# Patient Record
Sex: FEMALE | Race: WHITE
[De-identification: names, ages, dates, MRNs, and addresses within clinical notes are randomized per-mention and may not be internally consistent; named-entity substitution may affect disease eponyms.]

---

## 2017-10-15 ENCOUNTER — HEALTH MAINTENANCE LETTER (OUTPATIENT)
Age: 22
End: 2017-10-15

## 2018-08-13 ENCOUNTER — HEALTH MAINTENANCE LETTER (OUTPATIENT)
Age: 23
End: 2018-08-13

## 2019-12-23 ENCOUNTER — APPOINTMENT (OUTPATIENT)
Age: 24
Setting detail: DERMATOLOGY
End: 2019-12-23

## 2019-12-23 VITALS — RESPIRATION RATE: 15 BRPM | HEIGHT: 64 IN | WEIGHT: 135 LBS

## 2019-12-23 DIAGNOSIS — L70.0 ACNE VULGARIS: ICD-10-CM

## 2019-12-23 PROCEDURE — OTHER URINE PREGNANCY TEST: OTHER

## 2019-12-23 PROCEDURE — 99214 OFFICE O/P EST MOD 30 MIN: CPT

## 2019-12-23 PROCEDURE — OTHER ISOTRETINOIN INITIATION: OTHER

## 2019-12-23 PROCEDURE — OTHER ADDITIONAL NOTES: OTHER

## 2019-12-23 PROCEDURE — 81025 URINE PREGNANCY TEST: CPT

## 2019-12-23 ASSESSMENT — LOCATION ZONE DERM: LOCATION ZONE: FACE

## 2019-12-23 ASSESSMENT — LOCATION SIMPLE DESCRIPTION DERM: LOCATION SIMPLE: LEFT CHEEK

## 2019-12-23 ASSESSMENT — LOCATION DETAILED DESCRIPTION DERM: LOCATION DETAILED: LEFT CENTRAL MALAR CHEEK

## 2019-12-23 NOTE — PROCEDURE: ADDITIONAL NOTES
Detail Level: Simple
Additional Notes: - Will need 8-hour fasting baseline lab work at next office visit.

## 2019-12-23 NOTE — HPI: PIMPLES (ACNE)
How Severe Is Your Acne?: severe
Is This A New Presentation, Or A Follow-Up?: Acne
Additional Comments (Use Complete Sentences): She is interested in accutane. Minocycline helped but got minocycline staining.  Gets new pimples daily.   Uses oral contraceptive pill and condoms.

## 2020-01-24 ENCOUNTER — APPOINTMENT (OUTPATIENT)
Age: 25
Setting detail: DERMATOLOGY
End: 2020-01-24

## 2020-01-24 VITALS — RESPIRATION RATE: 15 BRPM | WEIGHT: 135 LBS | HEIGHT: 66 IN

## 2020-01-24 DIAGNOSIS — L70.0 ACNE VULGARIS: ICD-10-CM

## 2020-01-24 PROCEDURE — OTHER VENIPUNCTURE: OTHER

## 2020-01-24 PROCEDURE — OTHER ORDER TESTS: OTHER

## 2020-01-24 PROCEDURE — OTHER URINE PREGNANCY TEST: OTHER

## 2020-01-24 PROCEDURE — 99213 OFFICE O/P EST LOW 20 MIN: CPT | Mod: 25

## 2020-01-24 PROCEDURE — 81025 URINE PREGNANCY TEST: CPT

## 2020-01-24 PROCEDURE — 36415 COLL VENOUS BLD VENIPUNCTURE: CPT

## 2020-01-24 PROCEDURE — OTHER PRESCRIPTION: OTHER

## 2020-01-24 PROCEDURE — OTHER ISOTRETINOIN INITIATION: OTHER

## 2020-01-24 PROCEDURE — OTHER ADDITIONAL NOTES: OTHER

## 2020-01-24 RX ORDER — ISOTRETINOIN 20 MG/1
20MG CAPSULE, LIQUID FILLED ORAL
Qty: 30 | Refills: 0 | Status: ERX | COMMUNITY
Start: 2020-01-24

## 2020-01-24 ASSESSMENT — LOCATION ZONE DERM
LOCATION ZONE: FACE
LOCATION ZONE: ARM

## 2020-01-24 ASSESSMENT — LOCATION SIMPLE DESCRIPTION DERM
LOCATION SIMPLE: LEFT CHEEK
LOCATION SIMPLE: RIGHT UPPER ARM

## 2020-01-24 ASSESSMENT — LOCATION DETAILED DESCRIPTION DERM
LOCATION DETAILED: RIGHT ANTECUBITAL SKIN
LOCATION DETAILED: LEFT CENTRAL MALAR CHEEK

## 2020-01-24 NOTE — HPI: MEDICATION (ISOTRETINOIN)
Is This A New Presentation, Or A Follow-Up?: Follow Up Isotretinoin
Additional History: Patient here tostart isotretinoin today.

## 2020-01-24 NOTE — PROCEDURE: ADDITIONAL NOTES
Additional Notes: - Will need 8-hour fasting baseline lab work at next office visit.
Detail Level: Simple

## 2020-02-24 ENCOUNTER — HEALTH MAINTENANCE LETTER (OUTPATIENT)
Age: 25
End: 2020-02-24

## 2020-05-08 ENCOUNTER — APPOINTMENT (OUTPATIENT)
Age: 25
Setting detail: DERMATOLOGY
End: 2020-05-08

## 2020-05-08 VITALS — HEIGHT: 66 IN | WEIGHT: 130 LBS

## 2020-05-08 DIAGNOSIS — L70.0 ACNE VULGARIS: ICD-10-CM

## 2020-05-08 PROCEDURE — 99214 OFFICE O/P EST MOD 30 MIN: CPT

## 2020-05-08 PROCEDURE — OTHER URINE PREGNANCY TEST: OTHER

## 2020-05-08 PROCEDURE — OTHER ISOTRETINOIN MONITORING: OTHER

## 2020-05-08 PROCEDURE — 81025 URINE PREGNANCY TEST: CPT

## 2020-05-08 PROCEDURE — OTHER PRESCRIPTION: OTHER

## 2020-05-08 RX ORDER — ISOTRETINOIN 30 MG/1
30 CAPSULE, LIQUID FILLED ORAL TWICE PER DAY
Qty: 60 | Refills: 0 | Status: ERX | COMMUNITY
Start: 2020-05-08

## 2020-05-08 ASSESSMENT — LOCATION ZONE DERM: LOCATION ZONE: FACE

## 2020-05-08 ASSESSMENT — LOCATION SIMPLE DESCRIPTION DERM: LOCATION SIMPLE: LEFT CHEEK

## 2020-05-08 ASSESSMENT — LOCATION DETAILED DESCRIPTION DERM: LOCATION DETAILED: LEFT CENTRAL MALAR CHEEK

## 2020-05-08 NOTE — HPI: MEDICATION (ISOTRETINOIN)
How Severe Is It?: moderate
Is This A New Presentation, Or A Follow-Up?: Follow Up Isotretinoin
Additional History: Started isotretinoin at last office visit January and was following up in another derm clinic for past 2 months, has used 30mg bid these past 2 months. Dryness is tolerable. Patient reports 100% compliance with both forms of birth control. Had labs drawn april 6th and alt showed 18 and triglycerides were 103. March labs were also within normal limits. 5 or 6 new medium pimples this past month.

## 2020-06-15 ENCOUNTER — APPOINTMENT (OUTPATIENT)
Age: 25
Setting detail: DERMATOLOGY
End: 2020-06-15

## 2020-06-15 VITALS — HEIGHT: 66 IN | RESPIRATION RATE: 16 BRPM | WEIGHT: 130 LBS

## 2020-06-15 DIAGNOSIS — L70.0 ACNE VULGARIS: ICD-10-CM

## 2020-06-15 PROCEDURE — OTHER ISOTRETINOIN MONITORING: OTHER

## 2020-06-15 PROCEDURE — 81025 URINE PREGNANCY TEST: CPT

## 2020-06-15 PROCEDURE — OTHER PRESCRIPTION: OTHER

## 2020-06-15 PROCEDURE — 99214 OFFICE O/P EST MOD 30 MIN: CPT

## 2020-06-15 PROCEDURE — OTHER URINE PREGNANCY TEST: OTHER

## 2020-06-15 RX ORDER — ISOTRETINOIN 30 MG/1
30 CAPSULE, LIQUID FILLED ORAL TWICE PER DAY
Qty: 60 | Refills: 0 | Status: ERX

## 2020-06-15 ASSESSMENT — LOCATION ZONE DERM: LOCATION ZONE: FACE

## 2020-06-15 ASSESSMENT — LOCATION SIMPLE DESCRIPTION DERM: LOCATION SIMPLE: LEFT CHEEK

## 2020-06-15 ASSESSMENT — LOCATION DETAILED DESCRIPTION DERM: LOCATION DETAILED: LEFT CENTRAL MALAR CHEEK

## 2020-06-15 NOTE — HPI: MEDICATION (ISOTRETINOIN)
How Severe Is It?: moderate
Is This A New Presentation, Or A Follow-Up?: Follow Up Isotretinoin
Additional History: Patient reports 100% compliance with both forms of birth control. Dryness is tolerable. 5 small new pimples this past month.

## 2020-06-15 NOTE — PROCEDURE: ISOTRETINOIN MONITORING
Upper Range (In Mg/Kg): 150
Dosing Month 4 (Required For Cumulative Dosing): 30mg BID
Show Text Field For Brand Names Of Contraception?: Yes
Myalgia Monitoring: - At this point, patient reports that this is mild and tolerable. Explained this is common when taking isotretinoin. If this worsens, contact us.
Hypercholesterolemia Monitoring: I explained this is common when taking isotretinoin. We will monitor closely.
Retinoid Dermatitis Normal Treatment: - Recommended application of Cetaphil or CeraVe cream numerous times a day and before going to bed to all dry and rashy areas.
Xerosis Aggressive Treatment: - Recommended application of Cetaphil or CeraVe cream numerous times a day and before going to bed to all dry areas.\\n- Also, will prescribe a topical steroid for twice daily use as needed until pruritus is resolved for never longer than 14 days per month.
Cheilitis Normal Treatment: - Recommended application of Vaseline ointment or Aquaphor numerous times a day (as often as every hour) and before going to bed.
Is Cheilitis Present?: Yes - Normal Treatment
Female Pregnancy Counseling Text: Female patients should also be on two forms of birth control while taking this medication and for one month after their last dose.
Kilograms Preamble Statement (Weight Entered In Details Tab): Reported Weight in kilograms:
Xerosis Normal Treatment: - Recommended application of Cetaphil or CeraVe cream numerous times a day and before going to bed to all dry areas.
Use Therapeutic Ranged Or Therapeutic Target: please select Range or Target
Display Individual Monthly Dosage In The Note (If Yes Will Display All Dosages Which Are Not N/A): no
Cheilitis Aggressive Treatment: - Recommended application of Dr. Dan’s Cortibalm used several times per day (as often as every hour you are awake when very severe lip dryness is present).\\n- Once improved switch back to Vaseline or Aquaphor numerous times a day and before going to bed.
Nosebleeds Normal Treatment: - I explained nosebleeds are common when taking isotretinoin. \\n- Recommended saline mist in each nostril multiple times a day. \\n- May also consider applying Aquaphor intranasally once every night.\\n- If this worsens, call the clinic. Patient expressed understanding.
Headache Monitoring: - Recommended monitoring the headaches for now. There is no evidence of increased intracranial pressure. They were instructed to call if the headaches are worsening.
Nosebleeds Normal Treatment: I explained this is common when taking isotretinoin. I recommended saline mist in each nostril multiple times a day. If this worsens they will contact us.
Counseling Text: I reviewed the side effect in detail. Patient should get monthly blood tests, not donate blood, not drive at night if vision affected, and not share medication.
Retinoid Dermatitis Normal Treatment: I recommended more frequent application of Cetaphil or CeraVe to the areas of dermatitis.
Detail Level: Zone
Weight Units: pounds
Xerosis Aggressive Treatment: I recommended application of Cetaphil or CeraVe numerous times a day going to bed to all dry areas. I also prescribed a topical steroid for twice daily use.
Hypertriglyceridemia Monitoring: - I explained this is common when taking isotretinoin. \\n- Advised that taking over-the-counter fish oil can be helpful to reduce triglycerides.\\n- We will continue to monitor.
Headache Monitoring: I recommended monitoring the headaches for now. There is no evidence of increased intracranial pressure. They were instructed to call if the headaches are worsening.
Next Month's Dosage: Continue Current Dosage
Months Of Therapy Completed: 4
Retinoid Dermatitis Aggressive Treatment: - Recommended application of Cetaphil or CeraVe cream numerous times a day and before going to bed to all dry and rashy areas.\\n- Also, will prescribe a topical steroid for twice daily use as needed until pruritus is resolved for never longer than 14 days per month.
Pounds Preamble Statement (Weight Entered In Details Tab): Reported Weight in pounds:
Male Completion Statement: - After discussing his treatment course we decided to discontinue isotretinoin therapy at this time.\\n- He shouldn't donate blood for one month after the last dose. He should call with any new symptoms of depression.\\n- Discussed maintenance options.
Target Cumulative Dosage (In Mg/Kg): 135
Retinoid Dermatitis Aggressive Treatment: I recommended more frequent application of Cetaphil or CeraVe to the areas of dermatitis. I also prescribed a topical steroid for twice daily use until the dermatitis resolves.
Myalgia Monitoring: I explained this is common when taking isotretinoin. If this worsens they will contact us.
Cheilitis Normal Treatment: I recommended application of Vaseline or Aquaphor numerous times a day (as often as every hour) and before going to bed.
Dosing Month 1 (Required For Cumulative Dosing): 20mg Daily
Myalgia Treatment: I explained this is common when taking isotretinoin. If this worsens they will contact us. They may try OTC ibuprofen.
Lower Range (In Mg/Kg): 120
Cheilitis Aggressive Treatment: I recommended application of Vaseline or Aquaphor numerous times a day (as often as every hour) and before going to bed. I also prescribed a topical steroid for twice daily use.
Myalgia Treatment: - Explained this is common when taking isotretinoin. \\n- May try OTC ibuprofen no more frequently than every 6 hours.\\n- If this worsens, then contact us.
Female Completion Statement: FEMALE PATIENT - FINAL VISIT \\n- Discussed that we will be discontinuing isotretinoin today.\\n- Reminded that it is still imperative to maintain 100% compliance with the same methods of birth control she has been using throughout this course of isotretinoin for at least 30 days beyond her final pill. \\n- Advise that a final urine pregnancy test will be necessary to complete the retirements of the [employer] program in 30-37 days from now. \\n- Advised to schedule a nurse visit for this final UPT.   \\n- Reviewed the possible expectations for her acne from here on out.\\n- Discussed maintenance options.\\n- Urine pregnant test performed today.
What Is The Patient's Gender: Female
Xerosis Normal Treatment: I recommended application of Cetaphil or CeraVe numerous times a day going to bed to all dry areas.

## 2020-07-20 ENCOUNTER — APPOINTMENT (OUTPATIENT)
Age: 25
Setting detail: DERMATOLOGY
End: 2020-07-20

## 2020-07-20 VITALS — HEIGHT: 66 IN | WEIGHT: 133 LBS | RESPIRATION RATE: 16 BRPM

## 2020-07-20 DIAGNOSIS — L70.0 ACNE VULGARIS: ICD-10-CM

## 2020-07-20 PROCEDURE — OTHER PRESCRIPTION: OTHER

## 2020-07-20 PROCEDURE — OTHER ISOTRETINOIN MONITORING: OTHER

## 2020-07-20 PROCEDURE — OTHER URINE PREGNANCY TEST: OTHER

## 2020-07-20 PROCEDURE — 81025 URINE PREGNANCY TEST: CPT

## 2020-07-20 PROCEDURE — 99214 OFFICE O/P EST MOD 30 MIN: CPT

## 2020-07-20 RX ORDER — ISOTRETINOIN 30 MG/1
30 CAPSULE, LIQUID FILLED ORAL TWICE PER DAY
Qty: 60 | Refills: 0 | Status: ERX

## 2020-07-20 ASSESSMENT — LOCATION SIMPLE DESCRIPTION DERM: LOCATION SIMPLE: LEFT CHEEK

## 2020-07-20 ASSESSMENT — LOCATION ZONE DERM: LOCATION ZONE: FACE

## 2020-07-20 ASSESSMENT — LOCATION DETAILED DESCRIPTION DERM: LOCATION DETAILED: LEFT CENTRAL MALAR CHEEK

## 2020-07-20 NOTE — PROCEDURE: ISOTRETINOIN MONITORING
Cheilitis Aggressive Treatment: - Recommended application of Dr. Dan’s Cortibalm used several times per day (as often as every hour you are awake when very severe lip dryness is present).\\n- Once improved switch back to Vaseline or Aquaphor numerous times a day and before going to bed.
Upper Range (In Mg/Kg): 150
Detail Level: Zone
Headache Monitoring: - Recommended monitoring the headaches for now. There is no evidence of increased intracranial pressure. They were instructed to call if the headaches are worsening.
Nosebleeds Normal Treatment: - I explained nosebleeds are common when taking isotretinoin. \\n- Recommended saline mist in each nostril multiple times a day. \\n- May also consider applying Aquaphor intranasally once every night.\\n- If this worsens, call the clinic. Patient expressed understanding.
Retinoid Dermatitis Aggressive Treatment: - Recommended application of Cetaphil or CeraVe cream numerous times a day and before going to bed to all dry and rashy areas.\\n- Also, will prescribe a topical steroid for twice daily use as needed until pruritus is resolved for never longer than 14 days per month.
Female Completion Statement: FEMALE PATIENT - FINAL VISIT \\n- Discussed that we will be discontinuing isotretinoin today.\\n- Reminded that it is still imperative to maintain 100% compliance with the same methods of birth control she has been using throughout this course of isotretinoin for at least 30 days beyond her final pill. \\n- Advise that a final urine pregnancy test will be necessary to complete the retirements of the [employer] program in 30-37 days from now. \\n- Advised to schedule a nurse visit for this final UPT.   \\n- Reviewed the possible expectations for her acne from here on out.\\n- Discussed maintenance options.\\n- Urine pregnant test performed today.
Hypercholesterolemia Monitoring: I explained this is common when taking isotretinoin. We will monitor closely.
Is Cheilitis Present?: Yes - Normal Treatment
Counseling Text: I reviewed the side effect in detail. Patient should get monthly blood tests, not donate blood, not drive at night if vision affected, and not share medication.
Retinoid Dermatitis Normal Treatment: - Recommended application of Cetaphil or CeraVe cream numerous times a day and before going to bed to all dry and rashy areas.
Xerosis Normal Treatment: - Recommended application of Cetaphil or CeraVe cream numerous times a day and before going to bed to all dry areas.
Headache Monitoring: I recommended monitoring the headaches for now. There is no evidence of increased intracranial pressure. They were instructed to call if the headaches are worsening.
Hypertriglyceridemia Monitoring: - I explained this is common when taking isotretinoin. \\n- Advised that taking over-the-counter fish oil can be helpful to reduce triglycerides.\\n- We will continue to monitor.
Target Cumulative Dosage (In Mg/Kg): 135
Are Labs Available For Review?: Yes
Female Pregnancy Counseling Text: Female patients should also be on two forms of birth control while taking this medication and for one month after their last dose.
Next Month's Dosage: Continue Current Dosage
What Is The Patient's Gender: Female
Dosing Month 5 (Required For Cumulative Dosing): 30mg BID
Lower Range (In Mg/Kg): 120
Cheilitis Aggressive Treatment: I recommended application of Vaseline or Aquaphor numerous times a day (as often as every hour) and before going to bed. I also prescribed a topical steroid for twice daily use.
Retinoid Dermatitis Normal Treatment: I recommended more frequent application of Cetaphil or CeraVe to the areas of dermatitis.
Use Enhanced Counseling Feature (Automatic): No
Male Completion Statement: - After discussing his treatment course we decided to discontinue isotretinoin therapy at this time.\\n- He shouldn't donate blood for one month after the last dose. He should call with any new symptoms of depression.\\n- Discussed maintenance options.
Dosing Month 1 (Required For Cumulative Dosing): 20mg Daily
Nosebleeds Normal Treatment: I explained this is common when taking isotretinoin. I recommended saline mist in each nostril multiple times a day. If this worsens they will contact us.
Retinoid Dermatitis Aggressive Treatment: I recommended more frequent application of Cetaphil or CeraVe to the areas of dermatitis. I also prescribed a topical steroid for twice daily use until the dermatitis resolves.
Months Of Therapy Completed: 5
Cheilitis Normal Treatment: - Recommended application of Vaseline ointment or Aquaphor numerous times a day (as often as every hour) and before going to bed.
Myalgia Monitoring: - At this point, patient reports that this is mild and tolerable. Explained this is common when taking isotretinoin. If this worsens, contact us.
Myalgia Treatment: I explained this is common when taking isotretinoin. If this worsens they will contact us. They may try OTC ibuprofen.
Pounds Preamble Statement (Weight Entered In Details Tab): Reported Weight in pounds:
Xerosis Aggressive Treatment: - Recommended application of Cetaphil or CeraVe cream numerous times a day and before going to bed to all dry areas.\\n- Also, will prescribe a topical steroid for twice daily use as needed until pruritus is resolved for never longer than 14 days per month.
Cheilitis Normal Treatment: I recommended application of Vaseline or Aquaphor numerous times a day (as often as every hour) and before going to bed.
Weight Units: pounds
Myalgia Monitoring: I explained this is common when taking isotretinoin. If this worsens they will contact us.
Kilograms Preamble Statement (Weight Entered In Details Tab): Reported Weight in kilograms:
Use Therapeutic Ranged Or Therapeutic Target: please select Range or Target
Xerosis Aggressive Treatment: I recommended application of Cetaphil or CeraVe numerous times a day going to bed to all dry areas. I also prescribed a topical steroid for twice daily use.
Myalgia Treatment: - Explained this is common when taking isotretinoin. \\n- May try OTC ibuprofen no more frequently than every 6 hours.\\n- If this worsens, then contact us.
Xerosis Normal Treatment: I recommended application of Cetaphil or CeraVe numerous times a day going to bed to all dry areas.

## 2020-07-20 NOTE — HPI: MEDICATION (ISOTRETINOIN)
How Severe Is It?: moderate
Is This A New Presentation, Or A Follow-Up?: Follow Up Isotretinoin
Additional History: Patient reports 100% compliance with abstinence. Dryness is tolerable. 4 medium new pimples this past month.    Patient reports 100% compliance with abstinence.
25.5

## 2020-08-21 ENCOUNTER — APPOINTMENT (OUTPATIENT)
Dept: URBAN - METROPOLITAN AREA CLINIC 252 | Age: 25
Setting detail: DERMATOLOGY
End: 2020-08-21

## 2020-08-21 VITALS — WEIGHT: 130 LBS | HEIGHT: 66 IN | RESPIRATION RATE: 15 BRPM

## 2020-08-21 DIAGNOSIS — L70.0 ACNE VULGARIS: ICD-10-CM

## 2020-08-21 PROCEDURE — OTHER ISOTRETINOIN MONITORING: OTHER

## 2020-08-21 PROCEDURE — OTHER PRESCRIPTION: OTHER

## 2020-08-21 PROCEDURE — OTHER URINE PREGNANCY TEST: OTHER

## 2020-08-21 PROCEDURE — 81025 URINE PREGNANCY TEST: CPT

## 2020-08-21 PROCEDURE — 99214 OFFICE O/P EST MOD 30 MIN: CPT

## 2020-08-21 RX ORDER — ISOTRETINOIN 30 MG/1
30 CAPSULE, LIQUID FILLED ORAL TWICE PER DAY
Qty: 60 | Refills: 0 | Status: ERX

## 2020-08-21 ASSESSMENT — LOCATION ZONE DERM: LOCATION ZONE: FACE

## 2020-08-21 ASSESSMENT — LOCATION DETAILED DESCRIPTION DERM: LOCATION DETAILED: LEFT CENTRAL MALAR CHEEK

## 2020-08-21 ASSESSMENT — LOCATION SIMPLE DESCRIPTION DERM: LOCATION SIMPLE: LEFT CHEEK

## 2020-08-21 NOTE — PROCEDURE: ISOTRETINOIN MONITORING
Counseling Text: I reviewed the side effect in detail. Patient should get monthly blood tests, not donate blood, not drive at night if vision affected, and not share medication.
Xerosis Aggressive Treatment: I recommended application of Cetaphil or CeraVe numerous times a day going to bed to all dry areas. I also prescribed a topical steroid for twice daily use.
Nosebleeds Normal Treatment: I explained this is common when taking isotretinoin. I recommended saline mist in each nostril multiple times a day. If this worsens they will contact us.
Add High Risk Medication Management Associated Diagnosis?: Yes
Calculate Months Of Therapy Based On Documented Dosages (Will Hide Months Of Therapy Question)?: No
Use Therapeutic Ranged Or Therapeutic Target: please select Range or Target
Hypertriglyceridemia Treatment: - I explained this is common when taking isotretinoin. \\n- Advised that taking over-the-counter fish oil can be helpful to reduce triglycerides.\\n- We will continue to monitor.
Dosing Month 4 (Required For Cumulative Dosing): 30mg BID
Weight Units: pounds
Retinoid Dermatitis Aggressive Treatment: I recommended more frequent application of Cetaphil or CeraVe to the areas of dermatitis. I also prescribed a topical steroid for twice daily use until the dermatitis resolves.
Hypercholesterolemia Monitoring: I explained this is common when taking isotretinoin. We will monitor closely.
Cheilitis Normal Treatment: I recommended application of Vaseline or Aquaphor numerous times a day (as often as every hour) and before going to bed.
Dosing Month 1 (Required For Cumulative Dosing): 20mg Daily
Headache Monitoring: - Recommended monitoring the headaches for now. There is no evidence of increased intracranial pressure. They were instructed to call if the headaches are worsening.
Target Cumulative Dosage (In Mg/Kg): 135
Xerosis Normal Treatment: - Recommended application of Cetaphil or CeraVe cream numerous times a day and before going to bed to all dry areas.
Xerosis Normal Treatment: I recommended application of Cetaphil or CeraVe numerous times a day going to bed to all dry areas.
Myalgia Treatment: I explained this is common when taking isotretinoin. If this worsens they will contact us. They may try OTC ibuprofen.
Pounds Preamble Statement (Weight Entered In Details Tab): Reported Weight in pounds:
Retinoid Dermatitis Aggressive Treatment: - Recommended application of Cetaphil or CeraVe cream numerous times a day and before going to bed to all dry and rashy areas.\\n- Also, will prescribe a topical steroid for twice daily use as needed until pruritus is resolved for never longer than 14 days per month.
Cheilitis Normal Treatment: - Recommended application of Vaseline ointment or Aquaphor numerous times a day (as often as every hour) and before going to bed.
Headache Monitoring: I recommended monitoring the headaches for now. There is no evidence of increased intracranial pressure. They were instructed to call if the headaches are worsening.
Female Completion Statement: FEMALE PATIENT - FINAL VISIT \\n- Discussed that we will be discontinuing isotretinoin today.\\n- Reminded that it is still imperative to maintain 100% compliance with the same methods of birth control she has been using throughout this course of isotretinoin for at least 30 days beyond her final pill. \\n- Advise that a final urine pregnancy test will be necessary to complete the retirements of the [employer] program in 30-37 days from now. \\n- Advised to schedule a nurse visit for this final UPT.   \\n- Reviewed the possible expectations for her acne from here on out.\\n- Discussed maintenance options.\\n- Urine pregnant test performed today.
Months Of Therapy Completed: 6
Myalgia Treatment: - Explained this is common when taking isotretinoin. \\n- May try OTC ibuprofen no more frequently than every 6 hours.\\n- If this worsens, then contact us.
Myalgia Monitoring: I explained this is common when taking isotretinoin. If this worsens they will contact us.
Kilograms Preamble Statement (Weight Entered In Details Tab): Reported Weight in kilograms:
Male Completion Statement: - After discussing his treatment course we decided to discontinue isotretinoin therapy at this time.\\n- He shouldn't donate blood for one month after the last dose. He should call with any new symptoms of depression.\\n- Discussed maintenance options.
Detail Level: Zone
Upper Range (In Mg/Kg): 150
Next Month's Dosage: Continue Current Dosage
Lower Range (In Mg/Kg): 120
Nosebleeds Normal Treatment: - I explained nosebleeds are common when taking isotretinoin. \\n- Recommended saline mist in each nostril multiple times a day. \\n- May also consider applying Aquaphor intranasally once every night.\\n- If this worsens, call the clinic. Patient expressed understanding.
Myalgia Monitoring: - At this point, patient reports that this is mild and tolerable. Explained this is common when taking isotretinoin. If this worsens, contact us.
Cheilitis Aggressive Treatment: I recommended application of Vaseline or Aquaphor numerous times a day (as often as every hour) and before going to bed. I also prescribed a topical steroid for twice daily use.
Is Cheilitis Present?: Yes - Normal Treatment
Retinoid Dermatitis Normal Treatment: - Recommended application of Cetaphil or CeraVe cream numerous times a day and before going to bed to all dry and rashy areas.
Cheilitis Aggressive Treatment: - Recommended application of Dr. Dan’s Cortibalm used several times per day (as often as every hour you are awake when very severe lip dryness is present).\\n- Once improved switch back to Vaseline or Aquaphor numerous times a day and before going to bed.
Xerosis Aggressive Treatment: - Recommended application of Cetaphil or CeraVe cream numerous times a day and before going to bed to all dry areas.\\n- Also, will prescribe a topical steroid for twice daily use as needed until pruritus is resolved for never longer than 14 days per month.
Female Pregnancy Counseling Text: Female patients should also be on two forms of birth control while taking this medication and for one month after their last dose.
What Is The Patient's Gender: Female
Patient Weight (Optional But Required For Cumulative Dose-Numbers And Decimals Only): 130
Retinoid Dermatitis Normal Treatment: I recommended more frequent application of Cetaphil or CeraVe to the areas of dermatitis.

## 2020-12-13 ENCOUNTER — HEALTH MAINTENANCE LETTER (OUTPATIENT)
Age: 25
End: 2020-12-13

## 2021-04-08 NOTE — PROCEDURE: ISOTRETINOIN INITIATION
13 other disabling impairments- SP Left fem-pop bypass Ipledge Number (Optional): [id number] Ipledge Number (Optional): [id number]

## 2021-04-17 ENCOUNTER — HEALTH MAINTENANCE LETTER (OUTPATIENT)
Age: 26
End: 2021-04-17

## 2021-09-26 ENCOUNTER — HEALTH MAINTENANCE LETTER (OUTPATIENT)
Age: 26
End: 2021-09-26

## 2022-05-08 ENCOUNTER — HEALTH MAINTENANCE LETTER (OUTPATIENT)
Age: 27
End: 2022-05-08

## 2023-04-23 ENCOUNTER — HEALTH MAINTENANCE LETTER (OUTPATIENT)
Age: 28
End: 2023-04-23

## 2023-06-02 ENCOUNTER — HEALTH MAINTENANCE LETTER (OUTPATIENT)
Age: 28
End: 2023-06-02

## 2023-09-25 NOTE — PROCEDURE: ISOTRETINOIN MONITORING
Department of Dermatology        Guidelines for Care of Procedure Sites         Gently clean the area once a day using bar soap.    Gently pat the site dry, and apply sterile Vaseline packet provided by the Dermatology Department.    Cover the area with a new band-aid or other bandage daily for 5 days, or as needed.    4. Your biopsy will take about a week to be resulted.  We send you a letter in the mail if it comes back \"normal.\" we prefer to call you and discuss any unusual findings.  If you have not received your result within two weeks from your biopsy date, please call us at [phone].  Tell the person answering the phone that you are following up on a biopsy and need results.      After 5:00 pm please call the [facility] at [phone] if you have an urgent need to speak with a Dermatologist.     If you have questions please call Dept: [phone].    The specimen your doctor removed today is being referred to [facility] for diagnostic interpretation. [facility]’s dermatopathologists are board-certified and are specialists in the diagnosis of dermatologic disorders. These dermatopathologist will examine your specimen under a microscope and if necessary will consult with your doctor to obtain additional details such as clinical appearance and/or family history. This collaborative relationship between your doctor and our dermatopathologists helps ensure you are receiving the most precise and conclusive diagnosis.    Please note that your doctor’s fee for specimen removal does not include [facility]’s pathology charge. Our diagnostic service will be billed separately to your primary and secondary insurances. Within a few weeks, you will receive an explanation of benefits (EOB) from your insurance company detailing our fees and estimated payment. It’s very important to understand that this EOB is not a bill. If there is a balance due, often it is because you  have not met your annual deductible or your insurance plan includes a laboratory coinsurance, we will send you a detailed invoice. Since your doctor does not have access to [facility]’s billing information, please call [facility]’s billing department at [phone] with any questions.        Xerosis Aggressive Treatment: - Recommended application of Cetaphil or CeraVe cream numerous times a day and before going to bed to all dry areas.\\n- Also, will prescribe a topical steroid for twice daily use as needed until pruritus is resolved for never longer than 14 days per month.